# Patient Record
Sex: MALE | Race: WHITE | HISPANIC OR LATINO
[De-identification: names, ages, dates, MRNs, and addresses within clinical notes are randomized per-mention and may not be internally consistent; named-entity substitution may affect disease eponyms.]

---

## 2020-11-23 PROBLEM — Z00.00 ENCOUNTER FOR PREVENTIVE HEALTH EXAMINATION: Status: ACTIVE | Noted: 2020-11-23

## 2021-01-11 ENCOUNTER — LABORATORY RESULT (OUTPATIENT)
Age: 56
End: 2021-01-11

## 2021-01-12 ENCOUNTER — APPOINTMENT (OUTPATIENT)
Dept: SURGERY | Facility: CLINIC | Age: 56
End: 2021-01-12

## 2021-01-12 ENCOUNTER — TRANSCRIPTION ENCOUNTER (OUTPATIENT)
Age: 56
End: 2021-01-12

## 2021-01-13 ENCOUNTER — OUTPATIENT (OUTPATIENT)
Dept: OUTPATIENT SERVICES | Facility: HOSPITAL | Age: 56
LOS: 1 days | Discharge: HOME | End: 2021-01-13
Payer: MEDICAID

## 2021-01-13 ENCOUNTER — APPOINTMENT (OUTPATIENT)
Dept: SURGERY | Facility: CLINIC | Age: 56
End: 2021-01-13

## 2021-01-13 LAB — GLUCOSE BLDC GLUCOMTR-MCNC: 185 MG/DL — HIGH (ref 70–99)

## 2021-01-13 PROCEDURE — S2900 ROBOTIC SURGICAL SYSTEM: CPT | Mod: NC

## 2021-01-13 PROCEDURE — 49652: CPT | Mod: GC

## 2021-01-13 PROCEDURE — 49650 LAP ING HERNIA REPAIR INIT: CPT | Mod: AS

## 2021-01-13 PROCEDURE — 49650 LAP ING HERNIA REPAIR INIT: CPT | Mod: RT,GC

## 2021-01-13 RX ORDER — IBUPROFEN 200 MG
1 TABLET ORAL
Qty: 9 | Refills: 0
Start: 2021-01-13 | End: 2021-01-15

## 2021-01-13 RX ORDER — ACETAMINOPHEN WITH CODEINE 300MG-30MG
1 TABLET ORAL
Qty: 8 | Refills: 0
Start: 2021-01-13 | End: 2021-01-14

## 2021-01-19 ENCOUNTER — APPOINTMENT (OUTPATIENT)
Dept: SURGERY | Facility: CLINIC | Age: 56
End: 2021-01-19
Payer: MEDICAID

## 2021-01-19 VITALS
OXYGEN SATURATION: 98 % | WEIGHT: 163 LBS | TEMPERATURE: 97.3 F | HEIGHT: 68 IN | BODY MASS INDEX: 24.71 KG/M2 | SYSTOLIC BLOOD PRESSURE: 129 MMHG | HEART RATE: 79 BPM | DIASTOLIC BLOOD PRESSURE: 78 MMHG

## 2021-01-19 PROCEDURE — 99024 POSTOP FOLLOW-UP VISIT: CPT

## 2021-01-19 NOTE — PHYSICAL EXAM
[Normal] : affect appropriate [de-identified] : incisions clean, well healing, no signs of infection

## 2021-01-19 NOTE — ASSESSMENT
[FreeTextEntry1] : Pt is a 56 y/o M who presents today for post op s/p right inguinal and umbilical hernia repair 1/13/21. Doing well. Pain has resolved. On physical exam, pt's incisions are clean and well healing with no signs of infection. Pt will follow up in one month in the office that is convenient for him.\par \par

## 2021-01-19 NOTE — ADDENDUM
[FreeTextEntry1] : This note was written by Imelda Menon on 01/19/2021 acting as scribe for Dr. Barron

## 2021-01-19 NOTE — END OF VISIT
[FreeTextEntry3] : All medical record entries made by the Scribe were at my, Dr. Barron's, discretion and personally dictated by me on 01/19/2021. I have reviewed the chart and agree that the record accurately reflects my personal performance of the history, physical exam, assessment and plan. I have also personally directed, reviewed and agreed to the chart.

## 2021-01-19 NOTE — HISTORY OF PRESENT ILLNESS
[de-identified] : Pt is a 54 y/o M who presents today for post op s/p right inguinal and umbilical hernia repair 1/13/21. Pt is doing well overall. He states he had pain up until two days ago but now it has resolved. States he is walking well and urinating normally.\par